# Patient Record
Sex: FEMALE | Race: WHITE | Employment: FULL TIME | URBAN - METROPOLITAN AREA
[De-identification: names, ages, dates, MRNs, and addresses within clinical notes are randomized per-mention and may not be internally consistent; named-entity substitution may affect disease eponyms.]

---

## 2024-10-16 ENCOUNTER — OFFICE VISIT (OUTPATIENT)
Dept: SURGERY | Age: 59
End: 2024-10-16
Payer: COMMERCIAL

## 2024-10-16 VITALS — WEIGHT: 258.4 LBS

## 2024-10-16 DIAGNOSIS — L02.213 CHEST WALL ABSCESS: Primary | ICD-10-CM

## 2024-10-16 PROCEDURE — 10060 I&D ABSCESS SIMPLE/SINGLE: CPT | Performed by: SURGERY

## 2024-10-16 PROCEDURE — 99204 OFFICE O/P NEW MOD 45 MIN: CPT | Performed by: SURGERY

## 2024-10-16 RX ORDER — SULFAMETHOXAZOLE/TRIMETHOPRIM 800-160 MG
1 TABLET ORAL 2 TIMES DAILY
Qty: 14 TABLET | Refills: 0 | Status: SHIPPED | OUTPATIENT
Start: 2024-10-16 | End: 2024-10-23

## 2024-10-16 RX ORDER — DOXYCYCLINE 100 MG/1
100 CAPSULE ORAL 2 TIMES DAILY
COMMUNITY

## 2024-10-16 RX ORDER — METHYLPREDNISOLONE 4 MG
TABLET, DOSE PACK ORAL
COMMUNITY
Start: 2024-10-14

## 2024-10-16 RX ORDER — OMEPRAZOLE 10 MG/1
10 CAPSULE, DELAYED RELEASE ORAL DAILY
COMMUNITY

## 2024-10-16 ASSESSMENT — ENCOUNTER SYMPTOMS
SINUS PAIN: 0
ABDOMINAL PAIN: 0
COLOR CHANGE: 0
EYE ITCHING: 0
EYE PAIN: 0
COUGH: 0
NAUSEA: 0
SHORTNESS OF BREATH: 0
WHEEZING: 0
BACK PAIN: 0
EYE DISCHARGE: 0
SINUS PRESSURE: 0
VOMITING: 0

## 2024-10-16 NOTE — PROGRESS NOTES
Katty López MD   [facility]  [address] [address] [facility], [address]  [place] [address]  Phone [phone]   Fax [phone]      Date of visit: 10/16/2024      Primary/Requesting provider: None, None         Name: Zulema Langley      MRN: [id number]       : 1965       Age: 59 y.o.    Sex: female        PCP: None, None     CC:    Chief Complaint   Patient presents with    New Patient     NP - mid chest wall abscess x  wk       HPI:     Zulema Langley is a 59 y.o. female who presents for evaluation of chest abscess. She reports it started about a week ago and it got progressively worse over time. She reports pain 10/10. Pain was worse with pressure and better with ice. She was prescribed antibiotic and sent here for evaluation.         PMH:    History reviewed. No pertinent past medical history.    PSH:    No past surgical history on file.    MEDS:    Current Outpatient Medications   Medication Sig Dispense Refill    doxycycline hyclate (VIBRAMYCIN) 100 MG capsule Take 1 capsule by mouth 2 times daily      methylPREDNISolone (MEDROL, TRUMAN,) 4 MG tablet by NOT APPLICABLE route      omeprazole (PRILOSEC) 10 MG delayed release capsule Take 1 capsule by mouth daily       No current facility-administered medications for this visit.        ALLERGIES:      Allergies   Allergen Reactions    Pcn [Penicillins] Anaphylaxis       SH:    Social History     Tobacco Use    Smoking status: Never    Smokeless tobacco: Never       FH:    No family history on file.    Review of systems:  Review of Systems   Constitutional:  Negative for chills and fever.   HENT:  Negative for sinus pressure, sinus pain and sneezing.    Eyes:  Negative for pain, discharge and itching.   Respiratory:  Negative for cough, shortness of breath and wheezing.    Cardiovascular:  Negative for chest pain and palpitations.   Gastrointestinal:  Negative for abdominal pain, nausea and

## 2024-10-16 NOTE — PROGRESS NOTES
Incision & Drainage    Date/Time: 10/16/2024 11:41 AM    Performed by: Katty Bills MD  Authorized by: Katty Bills MD    Consent:     Consent obtained:  Written    Consent given by:  Patient    Risks, benefits, and alternatives were discussed: yes      Risks discussed:  Bleeding, pain and infection    Alternatives discussed:  No treatment  Universal protocol:     Procedure explained and questions answered to patient or proxy's satisfaction: yes      Relevant documents present and verified: yes      Test results available : no      Imaging studies available: no      Required blood products, implants, devices, and special equipment available: no      Site/side marked: yes      Immediately prior to procedure, a time out was called: yes      Patient identity confirmed:  Verbally with patient  Location:     Type:  Abscess    Location:  Trunk    Trunk location:  Chest  Pre-procedure details:     Skin preparation:  Chlorhexidine  Sedation:     Sedation type:  None  Anesthesia:     Anesthesia method:  Local infiltration  Procedure type:     Complexity:  Simple  Procedure details:     Ultrasound guidance: no      Needle aspiration: no      Incision types:  Stab incision    Incision depth:  Dermal    Wound management:  Probed and deloculated    Drainage:  Bloody and purulent    Drainage amount:  Scant    Wound treatment:  Wound left open    Packing materials:  None  Post-procedure details:     Procedure completion:  Tolerated well, no immediate complications